# Patient Record
Sex: MALE | Race: WHITE | NOT HISPANIC OR LATINO | Employment: OTHER | URBAN - METROPOLITAN AREA
[De-identification: names, ages, dates, MRNs, and addresses within clinical notes are randomized per-mention and may not be internally consistent; named-entity substitution may affect disease eponyms.]

---

## 2021-02-26 ENCOUNTER — IMMUNIZATION (OUTPATIENT)
Dept: FAMILY MEDICINE | Facility: CLINIC | Age: 66
End: 2021-02-26
Payer: MEDICAID

## 2021-02-26 DIAGNOSIS — Z23 NEED FOR VACCINATION: Primary | ICD-10-CM

## 2021-02-26 PROCEDURE — 0011A COVID-19, MRNA, LNP-S, PF, 100 MCG/0.5 ML DOSE VACCINE: CPT | Mod: CV19,S$GLB,, | Performed by: FAMILY MEDICINE

## 2021-02-26 PROCEDURE — 0011A COVID-19, MRNA, LNP-S, PF, 100 MCG/0.5 ML DOSE VACCINE: ICD-10-PCS | Mod: CV19,S$GLB,, | Performed by: FAMILY MEDICINE

## 2021-02-26 PROCEDURE — 91301 COVID-19, MRNA, LNP-S, PF, 100 MCG/0.5 ML DOSE VACCINE: CPT | Mod: S$GLB,,, | Performed by: FAMILY MEDICINE

## 2021-02-26 PROCEDURE — 91301 COVID-19, MRNA, LNP-S, PF, 100 MCG/0.5 ML DOSE VACCINE: ICD-10-PCS | Mod: S$GLB,,, | Performed by: FAMILY MEDICINE

## 2021-03-26 ENCOUNTER — IMMUNIZATION (OUTPATIENT)
Dept: FAMILY MEDICINE | Facility: CLINIC | Age: 66
End: 2021-03-26
Payer: MEDICAID

## 2021-03-26 DIAGNOSIS — Z23 NEED FOR VACCINATION: Primary | ICD-10-CM

## 2021-03-26 PROCEDURE — 0012A COVID-19, MRNA, LNP-S, PF, 100 MCG/0.5 ML DOSE VACCINE: CPT | Mod: CV19,S$GLB,, | Performed by: FAMILY MEDICINE

## 2021-03-26 PROCEDURE — 91301 COVID-19, MRNA, LNP-S, PF, 100 MCG/0.5 ML DOSE VACCINE: ICD-10-PCS | Mod: S$GLB,,, | Performed by: FAMILY MEDICINE

## 2021-03-26 PROCEDURE — 91301 COVID-19, MRNA, LNP-S, PF, 100 MCG/0.5 ML DOSE VACCINE: CPT | Mod: S$GLB,,, | Performed by: FAMILY MEDICINE

## 2021-03-26 PROCEDURE — 0012A COVID-19, MRNA, LNP-S, PF, 100 MCG/0.5 ML DOSE VACCINE: ICD-10-PCS | Mod: CV19,S$GLB,, | Performed by: FAMILY MEDICINE

## 2024-09-20 ENCOUNTER — HOSPITAL ENCOUNTER (EMERGENCY)
Facility: HOSPITAL | Age: 69
Discharge: HOME | End: 2024-09-20
Attending: EMERGENCY MEDICINE
Payer: MEDICAID

## 2024-09-20 VITALS
SYSTOLIC BLOOD PRESSURE: 188 MMHG | HEART RATE: 90 BPM | TEMPERATURE: 98 F | RESPIRATION RATE: 18 BRPM | OXYGEN SATURATION: 96 % | BODY MASS INDEX: 23.7 KG/M2 | WEIGHT: 175 LBS | DIASTOLIC BLOOD PRESSURE: 102 MMHG | HEIGHT: 72 IN

## 2024-09-20 DIAGNOSIS — H61.23 BILATERAL IMPACTED CERUMEN: Primary | ICD-10-CM

## 2024-09-20 PROCEDURE — 99281 EMR DPT VST MAYX REQ PHY/QHP: CPT | Mod: 25

## 2024-09-20 PROCEDURE — 69209 REMOVE IMPACTED EAR WAX UNI: CPT | Mod: 50

## 2024-09-21 NOTE — ED PROVIDER NOTES
CHIEF COMPLAINT  Chief Complaint   Patient presents with    Ear Fullness     Bilateral ear fullness       HISTORY OF PRESENT ILLNESS  Codey Gonzalez is a 69 y.o. male presents to ER for evaluation of cerumen impaction. He states he could not hear out of his ears.  No other specific aggravating or relieving factors otherwise.      PAST MEDICAL HISTORY  History reviewed. No pertinent past medical history.    CURRENT MEDICATIONS    No current facility-administered medications for this encounter.  No current outpatient medications on file.    ALLERGIES    Review of patient's allergies indicates:   Allergen Reactions    Pcn [penicillins]        SURGICAL HISTORY    History reviewed. No pertinent surgical history.    SOCIAL HISTORY    Social History     Socioeconomic History    Marital status: Single   Tobacco Use    Smoking status: Never    Smokeless tobacco: Never       FAMILY HISTORY    No family history on file.    REVIEW OF SYSTEMS    Review of Systems   HENT:  Positive for ear pain.      All other systems reviewed and are negative    VITAL SIGNS:   BP (!) 188/102 (BP Location: Left arm)   Pulse 90   Temp 97.9 °F (36.6 °C) (Oral)   Resp 18   Ht 6' (1.829 m)   Wt 79.4 kg (175 lb)   SpO2 96%   BMI 23.73 kg/m²      Physical Exam  Constitutional:       Appearance: Normal appearance.   HENT:      Head: Normocephalic.      Right Ear: There is impacted cerumen.      Left Ear: There is impacted cerumen.   Cardiovascular:      Rate and Rhythm: Normal rate.   Pulmonary:      Effort: Pulmonary effort is normal. No respiratory distress.      Breath sounds: Normal breath sounds.   Abdominal:      Palpations: Abdomen is soft.   Musculoskeletal:         General: Normal range of motion.   Skin:     General: Skin is warm.      Capillary Refill: Capillary refill takes less than 2 seconds.   Neurological:      General: No focal deficit present.      Mental Status: He is alert.      GCS: GCS eye subscore is 4. GCS verbal subscore is  "5. GCS motor subscore is 6.   Psychiatric:         Attention and Perception: Attention normal.         Mood and Affect: Mood normal.         Speech: Speech normal.       Vitals and nursing note reviewed.     LABS    Labs Reviewed - No data to display      EKG    No results found for this or any previous visit.      RADIOLOGY    No orders to display         PROCEDURES    Ear Wax Removal    Date/Time: 9/20/2024 7:40 PM    Performed by: Oscar Abrams NP  Authorized by: Lalit Barbosa MD    Anesthesia:  Local Anesthetic: none  Location details: both ears  Procedure type: irrigation Cerumen Removal Results: Cerumen completely removed.  Patient tolerance: Patient tolerated the procedure well with no immediate complications          Medications - No data to display             Medical Decision Making  Codey Gonzalez is a 69 y.o. male presents to ER for evaluation of cerumen impaction. He states he could not hear out of his ears.  No other specific aggravating or relieving factors otherwise.    Ddx:Otitis media with effusion, myringitis, mastoiditis, cholesteatoma, otitis externa     Removed bilateral cerumen impaction successfully. Patient tolerated well.  After cerumen impaction removed, pt states "too loud"    Problems Addressed:  Bilateral impacted cerumen: acute illness or injury    Risk  Minor surgery with identified risk factors.           There are no discharge medications for this patient.      There are no discharge medications for this patient.      I discussed with patient and/or family/caretaker that evaluation in the ED does not suggest any emergent or life threatening medical condition requiring immediate intervention beyond what was provided in the ED, and I believe the patient is safe for discharge.  Regardless, an unremarkable evaluation in the ED does not preclude the development or presence of a serious or life threatening condition. As such, patient was instructed to return immediately for any " worsening or change in current symptoms  Patient agrees with plan of care.    DISPOSITION  Patient discharged to home in stable condition.        FINAL IMPRESSION    1. Bilateral impacted cerumen         Oscar Abrams, SANTY  09/21/24 0016